# Patient Record
Sex: FEMALE | Race: OTHER
[De-identification: names, ages, dates, MRNs, and addresses within clinical notes are randomized per-mention and may not be internally consistent; named-entity substitution may affect disease eponyms.]

---

## 2017-09-06 ENCOUNTER — HOSPITAL ENCOUNTER (OUTPATIENT)
Dept: HOSPITAL 62 - WI | Age: 42
End: 2017-09-06
Attending: PHYSICIAN ASSISTANT
Payer: OTHER GOVERNMENT

## 2017-09-06 DIAGNOSIS — Z12.31: Primary | ICD-10-CM

## 2017-09-06 PROCEDURE — G0202 SCR MAMMO BI INCL CAD: HCPCS

## 2017-09-06 PROCEDURE — 77067 SCR MAMMO BI INCL CAD: CPT

## 2017-09-06 NOTE — WOMENS IMAGING REPORT
EXAM DESCRIPTION:  BILAT SCREENING MAMMO W/CAD



COMPLETED DATE/TIME:  9/6/2017 8:17 am



REASON FOR STUDY:  SCREENING MAMMO Z12.31  ENCNTR SCREEN MAMMOGRAM FOR MALIGNANT NEOPLASM OF ANN



COMPARISON:  1/21/2015



TECHNIQUE:  Standard craniocaudal and mediolateral oblique views of each breast recorded using PermissionTV[employer]
l acquisition.



LIMITATIONS:  None.



FINDINGS:  RIGHT BREAST

MASSES: No suspicious masses.

CALCIFICATIONS: No new or suspicious calcifications.

ARCHITECTURAL DISTORTION: None.

DEVELOPING DENSITY: None.

ASYMMETRY: None noted.

OTHER: No other significant findings.

LEFT BREAST

MASSES: No suspicious masses.

CALCIFICATIONS: No new or suspicious calcifications.

ARCHITECTURAL DISTORTION: None.

DEVELOPING DENSITY: On the left breast CC view only, laterally, a subcentimeter nodule versus superim
posed shadows is present for which additional left breast cone compression CC view, left breast CC an
d 90 tomosynthesis recommended.  If this finding persists on follow-up mammo/mary, then ultrasound w
ould also be required for followup

ASYMMETRY: None noted.

OTHER: No other significant findings.

Read with the assistance of CAD.

.[facility] - R2 Cenova Version 1.3

.[facility] - R2 Cenova Version 1.3

.[facility] - R2 Cenova Version 2.4

.[facility] [place] - R2 Cenova Version 2.4

.[facility] - R2  Version 9.2



IMPRESSION:  No mammographic evidence for malignancy right breast.

Superimposed shadows versus nodule left breast laterally seen on CC view only, for which additional d
iagnostic mammograms/tomosynthesis and ultrasound is recommended.



BREAST DENSITY:  b. There are scattered areas of fibroglandular density.



BIRAD:  0 Incomplete:  Needs Additional Imaging Evaluation and/or prior Mammograms for Comparison.



RECOMMENDATION:  RECOMMENDED FOLLOW-UP: Additional left breast diagnostic mammograms and tomosynthesi
s

The patient will be contacted for additional imaging.



COMMENT:  The patient has been notified of the results by letter per SA requirements. Additional no
tification policies are in place for contacting patient with suspicious or incomplete findings.

Quality ID #[id number]: The American College of Radiology recommends an annual screening mammogram for women
 aged 40 years or over. This facility utilizes a reminder system to ensure that all patients receive 
reminder letters, and/or direct phone calls for appointments. This includes reminders for routine scr
eening mammograms, diagnostic mammograms, or other Breast Imaging Interventions when appropriate.  Th
is patient will be placed in the appropriate reminder system.

The American College of Radiology (ACR) has developed recommendations for screening MRI of the breast
s in certain patient populations, to be used in conjunction with mammography.  Breast MRI surveillanc
e may be appropriate for women with more than 20% lifetime risk of developing breast cancer  as deter
mined by genetic testing, significant family history of the disease, or history of mantle radiation f
or Hodgkins Disease.  ACR Practice Guidelines 2008.



TECHNICAL DOCUMENTATION:  FINDING NUMBER: (1)

ASSESSMENT: (1)

JOB ID:  [id number]

 2011 [employer]- All Rights Reserved

## 2017-09-18 ENCOUNTER — HOSPITAL ENCOUNTER (OUTPATIENT)
Dept: HOSPITAL 62 - WI | Age: 42
End: 2017-09-18
Attending: PHYSICIAN ASSISTANT
Payer: OTHER GOVERNMENT

## 2017-09-18 DIAGNOSIS — N63: Primary | ICD-10-CM

## 2017-09-18 PROCEDURE — 76642 ULTRASOUND BREAST LIMITED: CPT

## 2017-09-18 NOTE — WOMENS IMAGING REPORT
EXAM DESCRIPTION:  LEFT DIAGNOSTIC MAMMO W/CAD; U/S BREAST UNILAT LIMITED



COMPLETED DATE/TIME:  9/18/2017 10:29 am; 9/18/2017 10:52 am



REASON FOR STUDY:  LUMP; N63; LT BREAST N63 N63  UNSPECIFIED LUMP IN BREAST



COMPARISON:  9/6/2017 and 1/21/2015.



TECHNIQUE:  Additional images include a true lateral view and spot compression lateral and CC views.



LIMITATIONS:  None.



FINDINGS:  BREAST: left

MASSES: No suspicious masses.

CALCIFICATIONS: No new or suspicious calcifications.

ARCHITECTURAL DISTORTION: None.

DEVELOPING DENSITY: Focal density seen on screening mammography is less conspicuous on additional pat
ges.  This area blends into the adjacent parenchyma.

ASYMMETRY: None noted.

OTHER: No other significant findings.

BREAST ULTRASOUND:

TECHNIQUE: Static and dynamic grayscale images acquired of the left breast in the specific areas of c
linical/mammographic concern. Selected color Doppler images recorded.

ELASTOGRAPHY PERFORMED: No.

LIMITATIONS: None.

FINDINGS:

MASS: No mass identified.  Normal glandular tissue.

ELASTOGRAPHY CHARACTERISTICS: Not applicable.

OTHER: No other significant finding.



IMPRESSION:  Possible developing density less conspicuous on additional images.  No worrisome mammogr
aphic or sonographic findings.



BREAST DENSITY:  b. There are scattered areas of fibroglandular density.



BIRAD:  1 Negative.



RECOMMENDATION:  RECOMMENDED FOLLOW UP: Birads 1 or 2: The patient should resume routine screening .

SPECIFIC INTERVENTION/IMAGING/CONSULTATION RECOMMENDED:No additional intervention/ imaging/consultati
on needed at this time.

COMMUNICATION:The imaging findings were not discussed with the patient. Her referring provider has be
en notified of the findings.



COMMENT:  The patient has been notified of the results by letter per MQSA requirements. Additional no
tification policies are in place for contacting patient with suspicious or incomplete findings.

Quality ID #225: The American College of Radiology recommends an annual screening mammogram for women
 aged 40 years or over. This facility utilizes a reminder system to ensure that all patients receive 
reminder letters, and/or direct phone calls for appointments. This includes reminders for routine scr
eening mammograms, diagnostic mammograms, or other Breast Imaging Interventions when appropriate.  Th
is patient will be placed in the appropriate reminder system.

The American College of Radiology (ACR) has developed recommendations for screening MRI of the breast
s in certain patient populations, to be used in conjunction with mammography.  Breast MRI surveillanc
e may be appropriate for women with more than 20% lifetime risk of developing breast cancer  as deter
mined by genetic testing, significant family history of the disease, or history of mantle radiation f
or Hodgkins Disease.  ACR Practice Guidelines 2008.



TECHNICAL DOCUMENTATION:  FINDING NUMBER: (1)

ASSESSMENT: (1)

JOB ID:  [id number]

 2011 [employer]- All Rights Reserved

## 2017-09-18 NOTE — WOMENS IMAGING REPORT
EXAM DESCRIPTION:  LEFT DIAGNOSTIC MAMMO W/CAD; U/S BREAST UNILAT LIMITED



COMPLETED DATE/TIME:  9/18/2017 10:29 am; 9/18/2017 10:52 am



REASON FOR STUDY:  LUMP; N63; LT BREAST N63 N63  UNSPECIFIED LUMP IN BREAST



COMPARISON:  9/6/2017 and 1/21/2015.



TECHNIQUE:  Additional images include a true lateral view and spot compression lateral and CC views.



LIMITATIONS:  None.



FINDINGS:  BREAST: left

MASSES: No suspicious masses.

CALCIFICATIONS: No new or suspicious calcifications.

ARCHITECTURAL DISTORTION: None.

DEVELOPING DENSITY: Focal density seen on screening mammography is less conspicuous on additional pat
ges.  This area blends into the adjacent parenchyma.

ASYMMETRY: None noted.

OTHER: No other significant findings.

BREAST ULTRASOUND:

TECHNIQUE: Static and dynamic grayscale images acquired of the left breast in the specific areas of c
linical/mammographic concern. Selected color Doppler images recorded.

ELASTOGRAPHY PERFORMED: No.

LIMITATIONS: None.

FINDINGS:

MASS: No mass identified.  Normal glandular tissue.

ELASTOGRAPHY CHARACTERISTICS: Not applicable.

OTHER: No other significant finding.



IMPRESSION:  Possible developing density less conspicuous on additional images.  No worrisome mammogr
aphic or sonographic findings.



BREAST DENSITY:  b. There are scattered areas of fibroglandular density.



BIRAD:  1 Negative.



RECOMMENDATION:  RECOMMENDED FOLLOW UP: Birads 1 or 2: The patient should resume routine screening .

SPECIFIC INTERVENTION/IMAGING/CONSULTATION RECOMMENDED:No additional intervention/ imaging/consultati
on needed at this time.

COMMUNICATION:The imaging findings were not discussed with the patient. Her referring provider has be
en notified of the findings.



COMMENT:  The patient has been notified of the results by letter per MQSA requirements. Additional no
tification policies are in place for contacting patient with suspicious or incomplete findings.

Quality ID #225: The American College of Radiology recommends an annual screening mammogram for women
 aged 40 years or over. This facility utilizes a reminder system to ensure that all patients receive 
reminder letters, and/or direct phone calls for appointments. This includes reminders for routine scr
eening mammograms, diagnostic mammograms, or other Breast Imaging Interventions when appropriate.  Th
is patient will be placed in the appropriate reminder system.

The American College of Radiology (ACR) has developed recommendations for screening MRI of the breast
s in certain patient populations, to be used in conjunction with mammography.  Breast MRI surveillanc
e may be appropriate for women with more than 20% lifetime risk of developing breast cancer  as deter
mined by genetic testing, significant family history of the disease, or history of mantle radiation f
or Hodgkins Disease.  ACR Practice Guidelines 2008.



TECHNICAL DOCUMENTATION:  FINDING NUMBER: (1)

ASSESSMENT: (1)

JOB ID:  [id number]

 2011 [employer]- All Rights Reserved

## 2018-04-12 ENCOUNTER — HOSPITAL ENCOUNTER (OUTPATIENT)
Dept: HOSPITAL 62 - RAD | Age: 43
End: 2018-04-12
Attending: PHYSICIAN ASSISTANT
Payer: COMMERCIAL

## 2018-04-12 DIAGNOSIS — K42.9: ICD-10-CM

## 2018-04-12 DIAGNOSIS — R10.33: Primary | ICD-10-CM

## 2018-04-12 PROCEDURE — 74176 CT ABD & PELVIS W/O CONTRAST: CPT

## 2018-04-12 NOTE — RADIOLOGY REPORT (SQ)
EXAM DESCRIPTION:  CT ABD/PELVIS ORAL ONLY



COMPLETED DATE/TIME:  4/12/2018 7:30 am



REASON FOR STUDY:  PERIUMBILICAL ABD PAIN (R10.33) R10.33  PERIUMBILICAL PAIN



COMPARISON:  CT abdomen pelvis 9/22/2012

CT percutaneous drainage 9/27/2012



TECHNIQUE:  CT scan of the abdomen and pelvis performed without intravenous contrast.

Patient drank oral contrast.

Images reviewed with lung, soft tissue, and bone windows. Reconstructed coronal and sagittal MPR imag
es reviewed. All images stored on PACS.

All CT scanners at this facility use dose modulation, iterative reconstruction, and/or weight based d
osing when appropriate to reduce radiation dose to as low as reasonably achievable (ALARA).

[facility]: Dose Right  [facility]: CareDose    [facility]: Dose Right    [facility]: Teradose 4D    [facility]: Smart Strap



RADIATION DOSE:  CT Rad equipment meets quality standard of care and radiation dose reduction techniq
ues were employed. CTDIvol: 7.9 mGy. DLP: 438 mGy-cm.mGy.



LIMITATIONS:  None.



FINDINGS:  LOWER CHEST: No significant findings. No nodules or infiltrates.

NON-CONTRASTED LIVER, SPLEEN, ADRENALS: Evaluation limited by lack of IV contrast. No identified sign
ificant masses.

PANCREAS: No masses. No peripancreatic inflammatory changes.

GALLBLADDER: No identified stones by CT criteria. No inflammatory changes to suggest cholecystitis.

RIGHT KIDNEY AND URETER: No suspicious masses. Assessment limited by lack of IV contrast.   No signif
icant calcifications.   No hydronephrosis or hydroureter.

LEFT KIDNEY AND URETER: No suspicious masses. Assessment limited by lack of IV contrast.   No signifi
cant calcifications.   No hydronephrosis or hydroureter.

AORTA AND RETROPERITONEUM: No aneurysm. No retroperitoneal masses or adenopathy.

BOWEL AND PERITONEAL CAVITY: No obvious masses or inflammatory changes. No free fluid.  Pain in drank
 oral contrast.  No CT evidence of bowel obstruction.  No colonic diverticulosis.

APPENDIX: Normal.

PELVIS, BLADDER, AND ABDOMINAL WALL:No pelvic free fluid.  Post hysterectomy.  Left ovary identified 
on coronal image 46 and axial images 72-76, with a 3.4 cm cyst on axial image 75.

Patient is post ventral hernia repair in the midline, 6 cm superior to the umbilicus.  Intact mesh is
 seen on sagittal reconstruction images 36-41.  Tiny fat containing umbilical hernia, unchanged from 
prior CT 9/22/2012.

BONES: No significant findings.

OTHER: No other significant finding.



IMPRESSION:  Intact ventral hernia repair 6 cm superior to the umbilicus.

Tiny fat containing umbilical hernia similar compared to CT exam from 9/22/2012.



COMMENT:  Quality ID # [id number]: Final reports with documentation of one or more dose reduction techniques
 (e.g., Automated exposure control, adjustment of the mA and/or kV according to patient size, use of 
iterative reconstruction technique)



TECHNICAL DOCUMENTATION:  JOB ID:  [id number]

 2011 [employer]- All Rights Reserved



Reading location - IP/workstation name: [facility]